# Patient Record
Sex: FEMALE | URBAN - METROPOLITAN AREA
[De-identification: names, ages, dates, MRNs, and addresses within clinical notes are randomized per-mention and may not be internally consistent; named-entity substitution may affect disease eponyms.]

---

## 2023-03-09 ENCOUNTER — APPOINTMENT (RX ONLY)
Dept: URBAN - METROPOLITAN AREA CLINIC 292 | Facility: CLINIC | Age: 73
Setting detail: DERMATOLOGY
End: 2023-03-09

## 2023-03-09 DIAGNOSIS — L72.0 EPIDERMAL CYST: ICD-10-CM

## 2023-03-09 PROCEDURE — ? PHOTO-DOCUMENTATION

## 2023-03-09 PROCEDURE — 99202 OFFICE O/P NEW SF 15 MIN: CPT

## 2023-03-09 PROCEDURE — ? FULL BODY SKIN EXAM - DECLINED

## 2023-03-09 PROCEDURE — ? COUNSELING

## 2023-03-09 ASSESSMENT — LOCATION DETAILED DESCRIPTION DERM: LOCATION DETAILED: RIGHT CENTRAL BUCCAL CHEEK

## 2023-03-09 ASSESSMENT — LOCATION ZONE DERM: LOCATION ZONE: FACE

## 2023-03-09 ASSESSMENT — LOCATION SIMPLE DESCRIPTION DERM: LOCATION SIMPLE: RIGHT CHEEK

## 2023-03-09 NOTE — PROCEDURE: PHOTO-DOCUMENTATION
Detail Level: Detailed
Photo Preface (Leave Blank If You Do Not Want): Photographs were taken today.

## 2023-03-09 NOTE — PROCEDURE: COUNSELING
Patient Specific Counseling (Will Not Stick From Patient To Patient): \\n  At this point, patient declines to schedule surgical removal of this cyst, she will contact our office if she should change her mind, I would recommend plastics for removal.
Detail Level: Detailed